# Patient Record
Sex: MALE | Race: WHITE
[De-identification: names, ages, dates, MRNs, and addresses within clinical notes are randomized per-mention and may not be internally consistent; named-entity substitution may affect disease eponyms.]

---

## 2017-04-06 ENCOUNTER — HOSPITAL ENCOUNTER (OUTPATIENT)
Dept: HOSPITAL 47 - SLEEP | Age: 72
Discharge: HOME | End: 2017-04-06
Payer: MEDICARE

## 2017-04-06 DIAGNOSIS — E66.9: ICD-10-CM

## 2017-04-06 DIAGNOSIS — G47.33: Primary | ICD-10-CM

## 2017-04-06 DIAGNOSIS — I10: ICD-10-CM

## 2017-04-06 DIAGNOSIS — K21.9: ICD-10-CM

## 2017-04-06 PROCEDURE — 99201: CPT

## 2017-04-06 NOTE — CONS
DATE OF CONSULTATION:  04/06/2017 



CONSULTATION/NEW PATIENT EVALUATION



A 71-year-old gentleman who has been evaluated in the sleep center for 

possible obstructive sleep apnea-hypopnea syndrome.  



HISTORY OF PRESENT ILLNESS/SLEEP-WAKE EVALUATION:   



SLEEP SCHEDULE:  Patient's usual sleep schedule is from 10:00 p.m. to 

6:30 a.m. basically 7 days a week.  



FALLING ASLEEP:   No problem with falling asleep. No TV in bedroom. 



DURING SLEEP:  He sleeps usually on the side position. If he goes to 

the back position, he wakes up with choking. He snores during the 

sleep according to his wife quite loudly.  



DURING THE DAY/WAKE STATE:  The patient denied any significant 

sleepiness during the day. [place] Sleepiness Scale is one. He does 

not stay in the naps.  



PAST MEDICAL HISTORY: Positive for prostate carcinoma, status post 

prostatectomy, basal cell carcinoma of the face, arm and chest. 

Questionable history of stroke about 1-1/2 years ago with the problem 

with short episode of problem with speech. Acid reflux.  



MEDICATIONS: Omeprazole, Aleve. 



SOCIAL HISTORY:  Positive for smoking about for 10 years; quit 40 

years ago. Alcohol consumption 2 glasses of wine with dinner most of 

the nights.  



REVIEW OF SYSTEMS: Snoring, awakenings from sleep with choking. 

Primary acid reflux, ulcers, cancer. No fevers. No double vision. No 

recent chest pain. No shortness of breath. No abdominal pain. No 

bleeding episodes. No blood in urine. No seizure episodes.  



PHYSICAL EXAMINATION: 

GENERAL: A pleasant 71-year-old  gentleman without distress. 

VITAL SIGNS: /92, HR 83, RR 16. Height 69. Weight 240.4. 

Temperature 98.7, oxygen saturation at room air 97%. BMI 35.4. Neck 

18-1/4 inches in circumference.  

HEENT: PERRLA, EOMI. Evaluation of oropharynx showed low position of 

soft palate. Some redness of the face.  

NECK: Supple. No JVD. Thyroid is not palpable. 

LUNGS: Clear to percussion and to auscultation. Good air exchange. No 

wheezing or rhonchi.  

HEART: S1, S2 regular. No murmurs, gallops or rubs. 

ABDOMEN: Slightly obese. Soft and nontender. Bowel sounds are present. 

No organomegaly appreciated.  

EXTREMITIES: No clubbing or cyanosis. 

CNS: Awake, alert, and oriented x3. Cranial nerves 2 to 7 intact. 

There is no fasciculation or atrophy noted. No focal deficits 

observed.  



IMPRESSION: 

1. Snoring, awakenings from choking while on the back position, low 

position of soft palate, wide neck, obesity, body mass index of 35.4; 

obstructive sleep apnea-hypopnea syndrome.  

2. Hypertension in the office. 

3. Some redness over the face. 

4. Acid reflux. 

5. History of right shoulder problems. 

6. History of prostate cancer, status post prostatectomy. 

7. History of basal cell carcinoma of the face, arm and chest areas 

status post surgical treatment.  

8. History of questionable stroke about 1-1/2 years ago with problem 

of the speech at that time.  



PLAN: 

1. Polysomnography for evaluation of patient's breathing during sleep. 

2. CPAP/BiPAP titration if sleep study confirms obstructive sleep 

apnea-hypopnea syndrome.  

3. Preferable position during sleep on the side. 

4. No driving if patient feels any sleepiness. Patient is aware of 

civil and criminal liability for unsafe driving.  

5. I will see patient for follow-up visit to explain results of the 

testing and following plan.  

6. Please check patient's level of hemoglobin and hematocrit to be 

sure that it is not increased above normal.  



Thank you very much for referring this patient for consultation.



Sincerely,







Vasile Urrutia MD, PhD, FAASM.

Diplomat of American Board of Sleep Medicine,

Sleep Medicine Board by American Board of Medical Specialities

American Board of Internal Medicine

Medical Director of [facility]

## 2017-05-17 ENCOUNTER — HOSPITAL ENCOUNTER (OUTPATIENT)
Dept: HOSPITAL 47 - RADECHMAIN | Age: 72
Discharge: HOME | End: 2017-05-17
Payer: MEDICARE

## 2017-05-17 DIAGNOSIS — I08.0: Primary | ICD-10-CM

## 2017-05-17 PROCEDURE — 93306 TTE W/DOPPLER COMPLETE: CPT

## 2017-05-17 PROCEDURE — 93350 STRESS TTE ONLY: CPT

## 2017-05-17 PROCEDURE — 93017 CV STRESS TEST TRACING ONLY: CPT

## 2017-05-17 NOTE — ECHOF
Referral Reason:R94.31 adn ekg



MEASUREMENTS

--------

HEIGHT: 185.4 cm

WEIGHT: 106.6 kg

BP: 194/95

RVIDd:   2.7 cm     (< 3.3)

IVSd:   1.1 cm     (0.6 - 1.1)

LVIDd:   4.2 cm     (3.9 - 5.3)

LVPWd:   1.2 cm     (0.6 - 1.1)

IVSs:   1.6 cm

LVIDs:   2.8 cm

LVPWs:   1.7 cm

LA Diam:   3.1 cm     (2.7 - 3.8)

LAESV Index (A-L):   26.27 ml/m

Ao Diam:   4.0 cm     (2.0 - 3.7)

AV Cusp:   2.6 cm     (1.5 - 2.6)

MV EXCURSION:   17.354 mm     (> 18.000)

MV EF SLOPE:   95 mm/s     (70 - 150)

EPSS:   0.8 cm

MV E Manuel:   0.64 m/s

MV DecT:   211 ms

MV A Manuel:   0.79 m/s

MV E/A Ratio:   0.80 







FINDINGS

--------

Sinus rhythm.

This was a technically adequate study.

The left ventricular size is normal.   There is 

borderline concentric left ventricular hypertrophy.   

Overall left ventricular systolic function is normal 

with, an EF between 55 - 60 %.

The right ventricle is normal in size and function.

Normal LA  size by volume 22+/-6 ml/m2.

The right atrium is normal in size.

Aortic valve is trileaflet and is mildly thickened.

Mild mitral annular calcification present.   There is 

trace to mild mitral regurgitation.

The tricuspid valve appears structurally normal.

The pulmonic valve was not well visualized.

The aortic root is dilated measuring 4.0cm.

IVC Not well visulized.

The pericardium is normal.



CONCLUSIONS

--------

1. Sinus rhythm.

2. Mild mitral annular calcification present.

3. There is trace to mild mitral regurgitation.

4. The tricuspid valve appears structurally normal.

5. The pulmonic valve was not well visualized.

6. The aortic root is dilated measuring 4.0cm.

7. The pericardium is normal.

8. This was a technically adequate study.

9. The left ventricular size is normal.

10. There is borderline concentric left ventricular 

hypertrophy.

11. Overall left ventricular systolic function is normal 

with, an EF between 55 - 60 %.

12. The right ventricle is normal in size and function.

13. Normal LA size by volume 22+/-6 ml/m2.

14. The right atrium is normal in size.

15. Aortic valve is trileaflet and is mildly thickened.





SONOGRAPHER: Sarai Acosta RDCS

## 2018-07-19 ENCOUNTER — HOSPITAL ENCOUNTER (OUTPATIENT)
Dept: HOSPITAL 47 - LABPAT | Age: 73
Discharge: HOME | End: 2018-07-19
Payer: MEDICARE

## 2018-07-19 DIAGNOSIS — R07.2: ICD-10-CM

## 2018-07-19 DIAGNOSIS — Z01.812: Primary | ICD-10-CM

## 2018-07-19 LAB
ANION GAP SERPL CALC-SCNC: 8 MMOL/L
BUN SERPL-SCNC: 20 MG/DL (ref 9–20)
CHLORIDE SERPL-SCNC: 103 MMOL/L (ref 98–107)
CO2 SERPL-SCNC: 29 MMOL/L (ref 22–30)
ERYTHROCYTE [DISTWIDTH] IN BLOOD BY AUTOMATED COUNT: 4.91 M/UL (ref 4.3–5.9)
ERYTHROCYTE [DISTWIDTH] IN BLOOD: 12.4 % (ref 11.5–15.5)
HCT VFR BLD AUTO: 48.3 % (ref 39–53)
HGB BLD-MCNC: 16 GM/DL (ref 13–17.5)
MCH RBC QN AUTO: 32.6 PG (ref 25–35)
MCHC RBC AUTO-ENTMCNC: 33.2 G/DL (ref 31–37)
MCV RBC AUTO: 98.4 FL (ref 80–100)
PLATELET # BLD AUTO: 270 K/UL (ref 150–450)
POTASSIUM SERPL-SCNC: 4.6 MMOL/L (ref 3.5–5.1)
SODIUM SERPL-SCNC: 140 MMOL/L (ref 137–145)
WBC # BLD AUTO: 6.1 K/UL (ref 3.8–10.6)

## 2018-07-19 PROCEDURE — 84520 ASSAY OF UREA NITROGEN: CPT

## 2018-07-19 PROCEDURE — 82565 ASSAY OF CREATININE: CPT

## 2018-07-19 PROCEDURE — 36415 COLL VENOUS BLD VENIPUNCTURE: CPT

## 2018-07-19 PROCEDURE — 85027 COMPLETE CBC AUTOMATED: CPT

## 2018-07-19 PROCEDURE — 80051 ELECTROLYTE PANEL: CPT

## 2018-07-25 ENCOUNTER — HOSPITAL ENCOUNTER (OUTPATIENT)
Dept: HOSPITAL 47 - CATHCVL | Age: 73
Discharge: HOME | End: 2018-07-25
Payer: MEDICARE

## 2018-07-25 VITALS — SYSTOLIC BLOOD PRESSURE: 121 MMHG | DIASTOLIC BLOOD PRESSURE: 77 MMHG

## 2018-07-25 VITALS — BODY MASS INDEX: 31.6 KG/M2

## 2018-07-25 VITALS — RESPIRATION RATE: 16 BRPM | TEMPERATURE: 97.8 F

## 2018-07-25 VITALS — HEART RATE: 59 BPM

## 2018-07-25 DIAGNOSIS — Z72.0: ICD-10-CM

## 2018-07-25 DIAGNOSIS — I10: ICD-10-CM

## 2018-07-25 DIAGNOSIS — I25.110: Primary | ICD-10-CM

## 2018-07-25 DIAGNOSIS — Z79.899: ICD-10-CM

## 2018-07-25 LAB
CHOLEST SERPL-MCNC: 198 MG/DL (ref ?–200)
HDLC SERPL-MCNC: 50 MG/DL (ref 40–60)
LDLC SERPL CALC-MCNC: 124 MG/DL (ref 0–99)
TRIGL SERPL-MCNC: 118 MG/DL (ref ?–150)

## 2018-07-25 PROCEDURE — 93458 L HRT ARTERY/VENTRICLE ANGIO: CPT

## 2018-07-25 PROCEDURE — 80061 LIPID PANEL: CPT

## 2018-07-25 NOTE — CC
CARDIAC CATHETERIZATION REPORT



DATE OF SERVICE:

07/25/2018.



INDICATION:

Exertional chest pain.



PROCEDURE NOTE:

After obtaining informed consent, left heart catheterization and coronary angiogram

were performed via the right femoral artery using standard Jennifer catheters. The

patient tolerated the procedure well without any obvious immediate complications.  A

femoral angiogram was performed and Angio-Seal was deployed for hemostasis.  Patient

received moderate conscious sedation.  Total sedation time was 15 minutes.



FINDINGS:

1. HEMODYNAMICS: Left ventricular end-diastolic pressure is 8 mm.  There is no

    significant gradient across aortic valve.

2. LEFT VENTRICULOGRAM:  Left ventriculogram is not performed.

3. ANGIOGRAPHIC DATA: Left main coronary artery left main coronary artery is a normal-

    sized vessel and is free of stenosis.  It divides into left anterior descending

    coronary artery and circumflex coronary artery. LAD shows mild nonobstructive

    coronary artery disease. Circumflex coronary artery, which is nondominant vessel,

    shows mild disease. Right coronary artery is a large dominant vessel, shows mild

    atherosclerotic plaque in its proximal part.



CONCLUSION:

Mild nonobstructive coronary artery disease.



PLAN:

Patient's management is going to be in the form of risk factor modification and optimal

medical therapy.  I asked him to take an aspirin and continue the Bystolic.  I will

check his lipid profile and, if necessary, start him on statins.





MMODL / IJN: [id number] / Job#: [id number]

## 2024-02-23 NOTE — ECHOS
DATE OF SERVICE:  05/17/2017



AGE:   71Y        SEX:  M        HT:  72      WT:  235 lbs.           

Protocol Kameron: X Others: Stress Echo Stage: II Dur. of Exercise: 5:15



*Heart Rate         Blood Pressure                     

*Rest:  63                    Rest:  150/78                           

*                              

*Max. Achieved:     133  Maximum BP:  158/76 

       85% PMHR:  128                    

          100% PMHR:  151          



*METS:  7.1    





INDICATIONS:  Abnormal EKG, preop.



MEDICATIONS: Prilosec, fish oil, Aleve. 



The test is being done to evaluate chest pains and abnormal EKG. 



STRESS DATA: Baseline EKG showed sinus rhythm with normal IA interval 

and QRS duration. Blood pressure at rest is 150/78, pulse rate of 63. 

Patient walked on the Kameron protocol for about 5 minutes and 15 

seconds, achieving a maximum heart rate of rate of 133 with blood 

pressure of 158/76. EKGs taken during and after exercise did not 

reveal any significant changes from the baseline. Occasional PVCs were 

noted.  



ECHO DATA WITH CONTRAST. Baseline echo images show normal wall motion 

and thickening. Exercise echo images with contrast showed augmentation 

of the wall motion and thickening in all the segments.  



FINAL IMPRESSION: 

1. Negative stress test. 

2. Negative stress echo.
Continue pharmacotherapy and psychoeducation.